# Patient Record
Sex: FEMALE | Race: WHITE | Employment: UNEMPLOYED | URBAN - METROPOLITAN AREA
[De-identification: names, ages, dates, MRNs, and addresses within clinical notes are randomized per-mention and may not be internally consistent; named-entity substitution may affect disease eponyms.]

---

## 2017-07-12 ENCOUNTER — HOSPITAL ENCOUNTER (EMERGENCY)
Age: 42
Discharge: HOME | End: 2017-07-13
Attending: EMERGENCY MEDICINE
Payer: SELF-PAY

## 2017-07-12 DIAGNOSIS — L03.119 CELLULITIS AND ABSCESS OF LEG, EXCEPT FOOT: Primary | ICD-10-CM

## 2017-07-12 DIAGNOSIS — L02.419 CELLULITIS AND ABSCESS OF LEG, EXCEPT FOOT: Primary | ICD-10-CM

## 2017-07-12 LAB
ALBUMIN SERPL BCP-MCNC: 3.6 G/DL (ref 3.5–5)
ALBUMIN/GLOB SERPL: 0.7 {RATIO} (ref 1.2–3.5)
ALP SERPL-CCNC: 108 U/L (ref 50–136)
ALT SERPL-CCNC: 18 U/L (ref 12–65)
ANION GAP BLD CALC-SCNC: 13 MMOL/L (ref 7–16)
AST SERPL W P-5'-P-CCNC: 30 U/L (ref 15–37)
BASOPHILS # BLD AUTO: 0 K/UL (ref 0–0.2)
BASOPHILS # BLD: 0 % (ref 0–2)
BILIRUB SERPL-MCNC: 0.3 MG/DL (ref 0.2–1.1)
BUN SERPL-MCNC: 8 MG/DL (ref 6–23)
CALCIUM SERPL-MCNC: 8.9 MG/DL (ref 8.3–10.4)
CHLORIDE SERPL-SCNC: 98 MMOL/L (ref 98–107)
CO2 SERPL-SCNC: 25 MMOL/L (ref 21–32)
CREAT SERPL-MCNC: 0.68 MG/DL (ref 0.6–1)
DIFFERENTIAL METHOD BLD: ABNORMAL
EOSINOPHIL # BLD: 0 K/UL (ref 0–0.8)
EOSINOPHIL NFR BLD: 0 % (ref 0.5–7.8)
ERYTHROCYTE [DISTWIDTH] IN BLOOD BY AUTOMATED COUNT: 16.3 % (ref 11.9–14.6)
GLOBULIN SER CALC-MCNC: 4.9 G/DL (ref 2.3–3.5)
GLUCOSE SERPL-MCNC: 269 MG/DL (ref 65–100)
HCT VFR BLD AUTO: 34.8 % (ref 35.8–46.3)
HGB BLD-MCNC: 11 G/DL (ref 11.7–15.4)
IMM GRANULOCYTES # BLD: 0.1 K/UL (ref 0–0.5)
IMM GRANULOCYTES NFR BLD AUTO: 0.4 % (ref 0–5)
LACTATE BLD-SCNC: 1.1 MMOL/L (ref 0.5–1.9)
LYMPHOCYTES # BLD AUTO: 16 % (ref 13–44)
LYMPHOCYTES # BLD: 2 K/UL (ref 0.5–4.6)
MCH RBC QN AUTO: 26.2 PG (ref 26.1–32.9)
MCHC RBC AUTO-ENTMCNC: 31.6 G/DL (ref 31.4–35)
MCV RBC AUTO: 82.9 FL (ref 79.6–97.8)
MONOCYTES # BLD: 0.9 K/UL (ref 0.1–1.3)
MONOCYTES NFR BLD AUTO: 7 % (ref 4–12)
NEUTS SEG # BLD: 9.7 K/UL (ref 1.7–8.2)
NEUTS SEG NFR BLD AUTO: 77 % (ref 43–78)
PLATELET # BLD AUTO: 561 K/UL (ref 150–450)
PMV BLD AUTO: 10.2 FL (ref 10.8–14.1)
POTASSIUM SERPL-SCNC: 4.8 MMOL/L (ref 3.5–5.1)
PROT SERPL-MCNC: 8.5 G/DL (ref 6.3–8.2)
RBC # BLD AUTO: 4.2 M/UL (ref 4.05–5.25)
SODIUM SERPL-SCNC: 136 MMOL/L (ref 136–145)
WBC # BLD AUTO: 12.7 K/UL (ref 4.3–11.1)

## 2017-07-12 PROCEDURE — 99284 EMERGENCY DEPT VISIT MOD MDM: CPT | Performed by: EMERGENCY MEDICINE

## 2017-07-12 PROCEDURE — 80053 COMPREHEN METABOLIC PANEL: CPT | Performed by: STUDENT IN AN ORGANIZED HEALTH CARE EDUCATION/TRAINING PROGRAM

## 2017-07-12 PROCEDURE — 87040 BLOOD CULTURE FOR BACTERIA: CPT | Performed by: STUDENT IN AN ORGANIZED HEALTH CARE EDUCATION/TRAINING PROGRAM

## 2017-07-12 PROCEDURE — 87040 BLOOD CULTURE FOR BACTERIA: CPT | Performed by: EMERGENCY MEDICINE

## 2017-07-12 PROCEDURE — 83605 ASSAY OF LACTIC ACID: CPT

## 2017-07-12 PROCEDURE — 85025 COMPLETE CBC W/AUTO DIFF WBC: CPT | Performed by: STUDENT IN AN ORGANIZED HEALTH CARE EDUCATION/TRAINING PROGRAM

## 2017-07-12 PROCEDURE — 96375 TX/PRO/DX INJ NEW DRUG ADDON: CPT | Performed by: EMERGENCY MEDICINE

## 2017-07-12 PROCEDURE — 96365 THER/PROPH/DIAG IV INF INIT: CPT | Performed by: EMERGENCY MEDICINE

## 2017-07-12 PROCEDURE — 75810000289 HC I&D ABSCESS SIMP/COMP/MULT: Performed by: EMERGENCY MEDICINE

## 2017-07-12 RX ORDER — BUPROPION HYDROCHLORIDE 300 MG/1
300 TABLET ORAL
COMMUNITY

## 2017-07-12 RX ORDER — MECLIZINE HYDROCHLORIDE 25 MG/1
TABLET ORAL
COMMUNITY

## 2017-07-12 RX ORDER — ONDANSETRON 2 MG/ML
4 INJECTION INTRAMUSCULAR; INTRAVENOUS
Status: COMPLETED | OUTPATIENT
Start: 2017-07-12 | End: 2017-07-13

## 2017-07-12 RX ORDER — BACLOFEN 10 MG/1
TABLET ORAL 3 TIMES DAILY
COMMUNITY

## 2017-07-12 RX ORDER — GABAPENTIN 600 MG/1
600 TABLET ORAL 3 TIMES DAILY
COMMUNITY
End: 2019-08-22

## 2017-07-12 RX ORDER — QUETIAPINE FUMARATE 200 MG/1
200 TABLET, FILM COATED ORAL 2 TIMES DAILY
COMMUNITY
End: 2019-08-22

## 2017-07-12 RX ORDER — DIAZEPAM 2 MG/1
TABLET ORAL
COMMUNITY
End: 2019-08-22

## 2017-07-12 RX ORDER — OXYBUTYNIN CHLORIDE 5 MG/1
10 TABLET ORAL
COMMUNITY

## 2017-07-12 RX ORDER — SERTRALINE HYDROCHLORIDE 100 MG/1
100 TABLET, FILM COATED ORAL
COMMUNITY

## 2017-07-12 RX ORDER — ACETAMINOPHEN AND CODEINE PHOSPHATE 300; 30 MG/1; MG/1
1 TABLET ORAL
COMMUNITY
End: 2019-08-22

## 2017-07-12 RX ORDER — ATORVASTATIN CALCIUM 20 MG/1
TABLET, FILM COATED ORAL DAILY
COMMUNITY

## 2017-07-12 RX ORDER — CARIPRAZINE 3 MG/1
3 CAPSULE, GELATIN COATED ORAL DAILY
COMMUNITY
End: 2019-08-22

## 2017-07-12 RX ORDER — MORPHINE SULFATE 4 MG/ML
4 INJECTION, SOLUTION INTRAMUSCULAR; INTRAVENOUS
Status: COMPLETED | OUTPATIENT
Start: 2017-07-12 | End: 2017-07-13

## 2017-07-12 RX ORDER — LORAZEPAM 2 MG/ML
1 INJECTION INTRAMUSCULAR
Status: COMPLETED | OUTPATIENT
Start: 2017-07-12 | End: 2017-07-13

## 2017-07-12 NOTE — ED TRIAGE NOTES
Pt thinks she got bit by something on her upper thigh of left leg. Pt states she went to [facility] on Monday and they told her she is fine. Pt has large area with ring around it. It is not draining, it does have several blister areas.

## 2017-07-13 VITALS
TEMPERATURE: 100 F | WEIGHT: 211 LBS | SYSTOLIC BLOOD PRESSURE: 118 MMHG | DIASTOLIC BLOOD PRESSURE: 68 MMHG | HEIGHT: 62 IN | RESPIRATION RATE: 18 BRPM | HEART RATE: 100 BPM | OXYGEN SATURATION: 94 % | BODY MASS INDEX: 38.83 KG/M2

## 2017-07-13 PROCEDURE — 75810000289 HC I&D ABSCESS SIMP/COMP/MULT: Performed by: EMERGENCY MEDICINE

## 2017-07-13 PROCEDURE — 74011000258 HC RX REV CODE- 258: Performed by: EMERGENCY MEDICINE

## 2017-07-13 PROCEDURE — 74011000250 HC RX REV CODE- 250: Performed by: EMERGENCY MEDICINE

## 2017-07-13 PROCEDURE — 74011250636 HC RX REV CODE- 250/636: Performed by: EMERGENCY MEDICINE

## 2017-07-13 RX ORDER — CLINDAMYCIN HYDROCHLORIDE 150 MG/1
450 CAPSULE ORAL 3 TIMES DAILY
Qty: 63 CAP | Refills: 0 | Status: SHIPPED | OUTPATIENT
Start: 2017-07-13 | End: 2017-07-20

## 2017-07-13 RX ORDER — HYDROCODONE BITARTRATE AND ACETAMINOPHEN 5; 325 MG/1; MG/1
1 TABLET ORAL
Qty: 20 TAB | Refills: 0 | Status: SHIPPED | OUTPATIENT
Start: 2017-07-13 | End: 2019-08-22

## 2017-07-13 RX ADMIN — MORPHINE SULFATE 4 MG: 4 INJECTION, SOLUTION INTRAMUSCULAR; INTRAVENOUS at 00:38

## 2017-07-13 RX ADMIN — LORAZEPAM 1 MG: 2 INJECTION INTRAMUSCULAR; INTRAVENOUS at 00:36

## 2017-07-13 RX ADMIN — SODIUM CHLORIDE 600 MG: 900 INJECTION, SOLUTION INTRAVENOUS at 00:41

## 2017-07-13 RX ADMIN — SODIUM CHLORIDE 1000 ML: 900 INJECTION, SOLUTION INTRAVENOUS at 00:41

## 2017-07-13 RX ADMIN — ONDANSETRON 4 MG: 2 INJECTION INTRAMUSCULAR; INTRAVENOUS at 00:38

## 2017-07-13 NOTE — ED PROVIDER NOTES
HPI Comments: 68-year-old diabetic female noticed an area of swelling and redness to the left upper thigh about 4 days ago. She went to her primary care doctor 2 days ago and was told that she just needed to soak it and take Benadryl. She is not sure if she got bitten by something, but did not see an insect. She denies any shortness of breath or chest pain. She has had subjective fevers and vomited once after taking several doses of Norco.  Redness and pain significantly worsened today so she came to the emergency department. No drainage.  has history of MRSA abscess. Patient is a 38 y.o. female presenting with skin infection. The history is provided by the patient. Skin Infection   Pertinent negatives include no chest pain, no abdominal pain, no headaches and no shortness of breath. Past Medical History:   Diagnosis Date    Cancer [facility])     Endocrine disease     thyroid, IDDM       Past Surgical History:   Procedure Laterality Date    HX CHOLECYSTECTOMY      HX GYN      tubes tied, d&c    HX ORTHOPAEDIC      right hand    HX OTHER SURGICAL      rhabdoid muscle to remove cancer         History reviewed. No pertinent family history. Social History     Social History    Marital status: SINGLE     Spouse name: N/A    Number of children: N/A    Years of education: N/A     Occupational History    Not on file. Social History Main Topics    Smoking status: Never Smoker    Smokeless tobacco: Never Used    Alcohol use Yes      Comment: rarely    Drug use: No    Sexual activity: Yes     Other Topics Concern    Not on file     Social History Narrative    No narrative on file         ALLERGIES: Sulfa (sulfonamide antibiotics)    Review of Systems   Constitutional: Positive for chills, diaphoresis, fatigue and fever. HENT: Negative for congestion. Eyes: Negative for visual disturbance. Respiratory: Negative for cough and shortness of breath.     Cardiovascular: Negative for chest pain. Gastrointestinal: Positive for nausea and vomiting. Negative for abdominal pain. Genitourinary: Negative for difficulty urinating. Musculoskeletal: Negative for back pain. Skin: Positive for color change and wound. Neurological: Negative for headaches. Psychiatric/Behavioral: Negative for confusion. The patient is nervous/anxious. Vitals:    07/12/17 1920   BP: 129/87   Pulse: (!) 124   Resp: 16   Temp: 100 °F (37.8 °C)   SpO2: 94%   Weight: 95.7 kg (211 lb)   Height: 5' 2\" (1.575 m)            Physical Exam   Constitutional: She appears well-developed and well-nourished. She appears distressed. HENT:   Head: Normocephalic and atraumatic. Eyes: Conjunctivae are normal. Pupils are equal, round, and reactive to light. Neck: Neck supple. Cardiovascular: Normal heart sounds. Tachycardia present. Pulmonary/Chest: Effort normal. No respiratory distress. Abdominal: Soft. Musculoskeletal:        Left hip: She exhibits tenderness and swelling. She exhibits normal range of motion and no crepitus. Legs:  Neurological: She is alert. Skin: Skin is dry. There is erythema. Psychiatric: Her mood appears anxious. Nursing note and vitals reviewed. MDM  Number of Diagnoses or Management Options  Diagnosis management comments: Parts of this document were created using dragon voice recognition software. The chart has been reviewed but errors may still be present. Patient has extreme anxiety and is crying during ultrasound examination. We'll premedicate with morphine and Ativan for I&D. Blood sugar elevated. Normal lactic. Patient hydrated and given clindamycin due to allergy to sulfa.        Amount and/or Complexity of Data Reviewed  Clinical lab tests: ordered and reviewed (Results for orders placed or performed during the hospital encounter of 07/12/17  -CBC WITH AUTOMATED DIFF       Result                                            Value Ref Range                       WBC                                               12.7 (H)                      4.3 - 11.1 K/uL                 RBC                                               4.20                          4.05 - 5.25 M/uL                HGB                                               11.0 (L)                      11.7 - 15.4 g/dL                HCT                                               34.8 (L)                      35.8 - 46.3 %                   MCV                                               82.9                          79.6 - 97.8 FL                  MCH                                               26.2                          26.1 - 32.9 PG                  MCHC                                              31.6                          31.4 - 35.0 g/dL                RDW                                               16.3 (H)                      11.9 - 14.6 %                   PLATELET                                          561 (H)                       150 - 450 K/uL                  MPV                                               10.2 (L)                      10.8 - 14.1 FL                  DF                                                AUTOMATED                                                     NEUTROPHILS                                       77                            43 - 78 %                       LYMPHOCYTES                                       16                            13 - 44 %                       MONOCYTES                                         7                             4.0 - 12.0 %                    EOSINOPHILS                                       0 (L)                         0.5 - 7.8 %                     BASOPHILS                                         0                             0.0 - 2.0 %                     IMMATURE GRANULOCYTES                             0.4                           0.0 - 5.0 %                     ABS. NEUTROPHILS                                  9.7 (H)                       1.7 - 8.2 K/UL                  ABS. LYMPHOCYTES                                  2.0                           0.5 - 4.6 K/UL                  ABS. MONOCYTES                                    0.9                           0.1 - 1.3 K/UL                  ABS. EOSINOPHILS                                  0.0                           0.0 - 0.8 K/UL                  ABS. BASOPHILS                                    0.0                           0.0 - 0.2 K/UL                  ABS. IMM.  GRANS.                                  0.1                           0.0 - 0.5 K/UL             -METABOLIC PANEL, COMPREHENSIVE       Result                                            Value                         Ref Range                       Sodium                                            136                           136 - 145 mmol/L                Potassium                                         4.8                           3.5 - 5.1 mmol/L                Chloride                                          98                            98 - 107 mmol/L                 CO2                                               25                            21 - 32 mmol/L                  Anion gap                                         13                            7 - 16 mmol/L                   Glucose                                           269 (H)                       65 - 100 mg/dL                  BUN                                               8                             6 - 23 MG/DL                    Creatinine                                        0.68                          0.6 - 1.0 MG/DL                 GFR est AA                                        >60                           >60 ml/min/1.73m2               GFR est non-AA                                    >60                           >60 ml/min/1.73m2               Calcium 8.9                           8.3 - 10.4 MG/DL                Bilirubin, total                                  0.3                           0.2 - 1.1 MG/DL                 ALT (SGPT)                                        18                            12 - 65 U/L                     AST (SGOT)                                        30                            15 - 37 U/L                     Alk. phosphatase                                  108                           50 - 136 U/L                    Protein, total                                    8.5 (H)                       6.3 - 8.2 g/dL                  Albumin                                           3.6                           3.5 - 5.0 g/dL                  Globulin                                          4.9 (H)                       2.3 - 3.5 g/dL                  A-G Ratio                                         0.7 (L)                       1.2 - 3.5                  -POC LACTIC ACID       Result                                            Value                         Ref Range                       Lactic Acid (POC)                                 1.1                           0.5 - 1.9 mmol/L           )  Tests in the medicine section of CPT®: ordered and reviewed    Risk of Complications, Morbidity, and/or Mortality  General comments: 2:10 AM  Line drawn around the area of erythema. Given strict return precautions. I discussed the results of all labs, procedures, radiographs, and treatments with the patient and available family. Treatment plan is agreed upon and the patient is ready for discharge. Questions about treatment in the ED and differential diagnosis of presenting condition were answered. Patient was given verbal discharge instructions including, but not limited to, importance of returning to the emergency department for any concern of worsening or continued symptoms.   Instructions were given to follow up with a primary care provider or specialist within 1-2 days. Adverse effects of medications, if prescribed, were discussed and patient was advised to refrain from significant physical activity until followed up by primary care physician and to not drive or operate heavy machinery after taking any sedating substances. ED Course       I&D Abcess Simple  Date/Time: 7/13/2017 2:09 AM  Performed by: Garett Caputo  Authorized by: Graett Caputo     Consent:     Consent obtained:  Verbal    Consent given by:  Patient    Risks discussed:  Incomplete drainage and pain    Alternatives discussed:  No treatment  Location:     Type:  Abscess    Size:  2cm    Location:  Lower extremity    Lower extremity location:  Leg    Leg location:  L upper leg  Pre-procedure details:     Skin preparation:  Betadine  Anesthesia (see MAR for exact dosages): Anesthesia method:  Local infiltration    Local anesthetic:  Lidocaine 1% w/o epi  Procedure type:     Complexity:  Simple  Procedure details:     Needle aspiration: no      Incision types:  Stab incision and single straight    Scalpel blade:  11    Wound management:  Probed and deloculated and irrigated with saline    Drainage:  Bloody and purulent    Drainage amount:  Scant    Wound treatment:  Wound left open    Packing materials:  None  Post-procedure details:     Patient tolerance of procedure:   Tolerated with difficulty

## 2017-07-13 NOTE — DISCHARGE INSTRUCTIONS
Cellulitis: Care Instructions  Your Care Instructions    Cellulitis is a skin infection. It often occurs after a break in the skin from a scrape, cut, bite, or puncture, or after a rash. The doctor has checked you carefully, but problems can develop later. If you notice any problems or new symptoms, get medical treatment right away. Follow-up care is a key part of your treatment and safety. Be sure to make and go to all appointments, and call your doctor if you are having problems. It's also a good idea to know your test results and keep a list of the medicines you take. How can you care for yourself at home? · Take your antibiotics as directed. Do not stop taking them just because you feel better. You need to take the full course of antibiotics. · Prop up the infected area on pillows to reduce pain and swelling. Try to keep the area above the level of your heart as often as you can. · If your doctor told you how to care for your wound, follow your doctor's instructions. If you did not get instructions, follow this general advice:  ¨ Wash the wound with clean water 2 times a day. Don't use hydrogen peroxide or alcohol, which can slow healing. ¨ You may cover the wound with a thin layer of petroleum jelly, such as Vaseline, and a nonstick bandage. ¨ Apply more petroleum jelly and replace the bandage as needed. · Be safe with medicines. Take pain medicines exactly as directed. ¨ If the doctor gave you a prescription medicine for pain, take it as prescribed. ¨ If you are not taking a prescription pain medicine, ask your doctor if you can take an over-the-counter medicine. To prevent cellulitis in the future  · Try to prevent cuts, scrapes, or other injuries to your skin. Cellulitis most often occurs where there is a break in the skin. · If you get a scrape, cut, mild burn, or bite, wash the wound with clean water as soon as you can to help avoid infection.  Don't use hydrogen peroxide or alcohol, which can slow healing. · If you have swelling in your legs (edema), support stockings and good skin care may help prevent leg sores and cellulitis. · Take care of your feet, especially if you have diabetes or other conditions that increase the risk of infection. Wear shoes and socks. Do not go barefoot. If you have athlete's foot or other skin problems on your feet, talk to your doctor about how to treat them. When should you call for help? Call your doctor now or seek immediate medical care if:  · You have signs that your infection is getting worse, such as:  ¨ Increased pain, swelling, warmth, or redness. ¨ Red streaks leading from the area. ¨ Pus draining from the area. ¨ A fever. · You get a rash. Watch closely for changes in your health, and be sure to contact your doctor if:  · You are not getting better after 1 day (24 hours). · You do not get better as expected. Where can you learn more? Go to http://gian-lyn.info/. Cielo Mayes in the search box to learn more about \"Cellulitis: Care Instructions. \"  Current as of: October 13, 2016  Content Version: 11.3  © 3806-7529 [employer]. Care instructions adapted under license by [employer] (which disclaims liability or warranty for this information). If you have questions about a medical condition or this instruction, always ask your healthcare professional. [place] [address] any warranty or liability for your use of this information. Skin Abscess: Care Instructions  Your Care Instructions    A skin abscess is a bacterial infection that forms a pocket of pus. A boil is a kind of skin abscess. The doctor may have cut an opening in the abscess so that the pus can drain out. You may have gauze in the cut so that the abscess will stay open and keep draining. You may need antibiotics. You will need to follow up with your doctor to make sure the infection has gone away.   The doctor has checked you carefully, but problems can develop later. If you notice any problems or new symptoms, get medical treatment right away. Follow-up care is a key part of your treatment and safety. Be sure to make and go to all appointments, and call your doctor if you are having problems. It's also a good idea to know your test results and keep a list of the medicines you take. How can you care for yourself at home? · Apply warm and dry compresses, a heating pad set on low, or a hot water bottle 3 or 4 times a day for pain. Keep a cloth between the heat source and your skin. · If your doctor prescribed antibiotics, take them as directed. Do not stop taking them just because you feel better. You need to take the full course of antibiotics. · Take pain medicines exactly as directed. ¨ If the doctor gave you a prescription medicine for pain, take it as prescribed. ¨ If you are not taking a prescription pain medicine, ask your doctor if you can take an over-the-counter medicine. · Keep your bandage clean and dry. Change the bandage whenever it gets wet or dirty, or at least one time a day. · If the abscess was packed with gauze:  ¨ Keep follow-up appointments to have the gauze changed or removed. If the doctor instructed you to remove the gauze, gently pull out all of the gauze when your doctor tells you to. ¨ After the gauze is removed, soak the area in warm water for 15 to 20 minutes 2 times a day, until the wound closes. When should you call for help? Call your doctor now or seek immediate medical care if:  · You have signs of worsening infection, such as:  ¨ Increased pain, swelling, warmth, or redness. ¨ Red streaks leading from the infected skin. ¨ Pus draining from the wound. ¨ A fever. Watch closely for changes in your health, and be sure to contact your doctor if:  · You do not get better as expected. Where can you learn more? Go to http://gian-lyn.info/.   Enter Y703 in the search box to learn more about \"Skin Abscess: Care Instructions. \"  Current as of: October 13, 2016  Content Version: 11.3  © 7943-3856 [employer]. Care instructions adapted under license by [employer] (which disclaims liability or warranty for this information). If you have questions about a medical condition or this instruction, always ask your healthcare professional. [address] any warranty or liability for your use of this information.

## 2017-07-17 LAB
BACTERIA SPEC CULT: NORMAL
BACTERIA SPEC CULT: NORMAL
SERVICE CMNT-IMP: NORMAL
SERVICE CMNT-IMP: NORMAL

## 2019-08-21 ENCOUNTER — HOSPITAL ENCOUNTER (OUTPATIENT)
Dept: MAMMOGRAPHY | Age: 44
Discharge: HOME | End: 2019-08-21
Attending: NURSE PRACTITIONER
Payer: COMMERCIAL

## 2019-08-21 DIAGNOSIS — Z12.31 VISIT FOR SCREENING MAMMOGRAM: ICD-10-CM

## 2019-08-21 PROCEDURE — 77067 SCR MAMMO BI INCL CAD: CPT

## 2019-08-23 ENCOUNTER — HOSPITAL ENCOUNTER (OUTPATIENT)
Dept: LAB | Age: 44
Discharge: HOME | End: 2019-08-23
Payer: COMMERCIAL

## 2019-08-23 LAB
ERYTHROCYTE [DISTWIDTH] IN BLOOD BY AUTOMATED COUNT: 13.1 % (ref 11.9–14.6)
HCT VFR BLD AUTO: 44.6 % (ref 35.8–46.3)
HGB BLD-MCNC: 14.8 G/DL (ref 11.7–15.4)
MCH RBC QN AUTO: 31.6 PG (ref 26.1–32.9)
MCHC RBC AUTO-ENTMCNC: 33.2 G/DL (ref 31.4–35)
MCV RBC AUTO: 95.1 FL (ref 79.6–97.8)
NRBC # BLD: 0 K/UL (ref 0–0.2)
PLATELET # BLD AUTO: 450 K/UL (ref 150–450)
PMV BLD AUTO: 9.7 FL (ref 9.4–12.3)
RBC # BLD AUTO: 4.69 M/UL (ref 4.05–5.2)
WBC # BLD AUTO: 9.2 K/UL (ref 4.3–11.1)

## 2019-08-23 PROCEDURE — 36415 COLL VENOUS BLD VENIPUNCTURE: CPT

## 2019-08-23 PROCEDURE — 85027 COMPLETE CBC AUTOMATED: CPT

## 2019-08-23 PROCEDURE — 93005 ELECTROCARDIOGRAM TRACING: CPT | Performed by: ANESTHESIOLOGY

## 2019-08-24 LAB
ATRIAL RATE: 83 BPM
CALCULATED P AXIS, ECG09: 57 DEGREES
CALCULATED R AXIS, ECG10: 53 DEGREES
CALCULATED T AXIS, ECG11: 59 DEGREES
DIAGNOSIS, 93000: NORMAL
P-R INTERVAL, ECG05: 140 MS
Q-T INTERVAL, ECG07: 368 MS
QRS DURATION, ECG06: 80 MS
QTC CALCULATION (BEZET), ECG08: 432 MS
VENTRICULAR RATE, ECG03: 83 BPM

## 2019-08-25 ENCOUNTER — ANESTHESIA EVENT (OUTPATIENT)
Dept: SURGERY | Age: 44
End: 2019-08-25
Payer: COMMERCIAL

## 2019-08-25 VITALS — HEIGHT: 66 IN | BODY MASS INDEX: 28.93 KG/M2 | WEIGHT: 180 LBS

## 2019-08-25 NOTE — H&P
History and Physical    Patient: Ghislaine Leone MRN: [id number]  SSN: [id number]    YOB: 1975  Age: 53 y.o. Sex: female      Vital Signs: /62 P88 R18 SPO2 98%  Visit Vitals  Ht 5' 5.5\" (1.664 m)   Wt 81.6 kg (180 lb)   BMI 29.50 kg/m²        Allergies: Allergies   Allergen Reactions    Latex Hives    Sulfa (Sulfonamide Antibiotics) Nausea and Vomiting       Chief Complaint: back, neck, and shoulder pain     History of Present Illness: back, neck, and shoulder pain     Justification for Procedure: back, neck, and shoulder pain     History:  Past Medical History:   Diagnosis Date    Anemia     Asthma     no inhalers    Bipolar 1 disorder ([facility] [address]     Cancer (HCC)     Diabetes ([facility] [address]     checks sugar-2-4x a week, hypo--70s- [id number]    Endocrine disease     thyroid, IDDM    Gastroparesis     Hypercholesteremia     Hypertension     no meds at this time    Insomnia     Overactive bladder     Stroke ([facility] [address]     tia- no reidual      Past Surgical History:   Procedure Laterality Date    HX CHOLECYSTECTOMY      HX GYN      tubes tied, d&c    HX HEENT      dental    HX ORTHOPAEDIC      right hand    HX OTHER SURGICAL      rhabdoid muscle to remove cancer-1973      Social History     Socioeconomic History    Marital status:      Spouse name: Not on file    Number of children: Not on file    Years of education: Not on file    Highest education level: Not on file   Tobacco Use    Smoking status: Former Smoker    Smokeless tobacco: Never Used   Substance and Sexual Activity    Alcohol use: Yes     Comment: rarely    Drug use: Not Currently    Sexual activity: Yes      Family History   Problem Relation Age of Onset    Lung Disease Mother        Medications:   Prior to Admission medications    Medication Sig Start Date End Date Taking? Authorizing Provider   ARIPiprazole (ABILIFY) 10 mg tablet Take 10 mg by mouth nightly.     Provider, Historical   ALPRAZolam Amara Toussaint 1 mg tablet Take  by mouth three (3) times daily as needed for Anxiety. Provider, Historical   HYDROcodone-acetaminophen (NORCO) 7.5-325 mg per tablet Take 1 Tab by mouth every six (6) hours as needed for Pain. Provider, Historical   insulin degludec (TRESIBA FLEXTOUCH U-100 SC) 20 Units by SubCUTAneous route. 20 units night- 15 units-am    Provider, Historical   metFORMIN (GLUCOPHAGE) 500 mg tablet Take  by mouth two (2) times daily (with meals). Provider, Historical   aspirin 81 mg chewable tablet Take 81 mg by mouth daily. Provider, Historical   naproxen (NAPROSYN PO) Take  by mouth as needed. Provider, Historical   pregabalin (LYRICA) 100 mg capsule Take  by mouth two (2) times a day. Provider, Historical   topiramate (TOPAMAX) 100 mg tablet Take  by mouth two (2) times a day. Provider, Historical   zolpidem (AMBIEN) 5 mg tablet Take  by mouth nightly as needed for Sleep. Provider, Historical   multivitamin (ONE A DAY) tablet Take 1 Tab by mouth daily. Provider, Historical   insulin aspart U-100 (NOVOLOG) 100 unit/mL injection by SubCUTAneous route as needed. Provider, Historical   phentermine (ADIPEX-P) 37.5 mg tablet Take 37.5 mg by mouth every morning. Provider, Historical   ferrous sulfate (IRON) 325 mg (65 mg iron) tablet Take  by mouth Daily (before breakfast). Provider, Historical   sertraline (ZOLOFT) 100 mg tablet Take 100 mg by mouth nightly. Shaun Jean MD   oxybutynin (DITROPAN) 5 mg tablet Take 10 mg by mouth nightly. Shaun Jean MD   buPROPion XL (WELLBUTRIN XL) 300 mg XL tablet Take 300 mg by mouth every morning. Shaun Jean MD   baclofen (LIORESAL) 10 mg tablet Take  by mouth three (3) times daily. Shaun Jean MD   atorvastatin (LIPITOR) 20 mg tablet Take  by mouth daily. Shaun Jean MD   meclizine (ANTIVERT) 25 mg tablet Take  by mouth three (3) times daily as needed.     Shaun Jean MD       Physical Exam:   Heart: regular rate and rhythm, S1, S2 normal, no murmur, click, rub or gallop   Lungs: clear to auscultation bilaterally   Surgical Site: bilateral breasts     Findings/Diagnosis: macromastia    Plan of Care/Planned Procedure: bilateral breast reduction    Signed By: Caroline Romeo MD    August 25, 2019

## 2019-08-25 NOTE — H&P (VIEW-ONLY)
History and Physical    Patient: Esther Wolf MRN: [id number]  SSN: [id number]    YOB: 1975  Age: 53 y.o. Sex: female      Vital Signs: /62 P88 R18 SPO2 98%  Visit Vitals  Ht 5' 5.5\" (1.664 m)   Wt 81.6 kg (180 lb)   BMI 29.50 kg/m²        Allergies: Allergies   Allergen Reactions    Latex Hives    Sulfa (Sulfonamide Antibiotics) Nausea and Vomiting       Chief Complaint: back, neck, and shoulder pain     History of Present Illness: back, neck, and shoulder pain     Justification for Procedure: back, neck, and shoulder pain     History:  Past Medical History:   Diagnosis Date    Anemia     Asthma     no inhalers    Bipolar 1 disorder ([facility] [address]     Cancer (HCC)     Diabetes ([facility] [address]     checks sugar-2-4x a week, hypo--70s- [id number]=2.0    Endocrine disease     thyroid, IDDM    Gastroparesis     Hypercholesteremia     Hypertension     no meds at this time    Insomnia     Overactive bladder     Stroke ([facility] [address]     tia- no reidual      Past Surgical History:   Procedure Laterality Date    HX CHOLECYSTECTOMY      HX GYN      tubes tied, d&c    HX HEENT      dental    HX ORTHOPAEDIC      right hand    HX OTHER SURGICAL      rhabdoid muscle to remove cancer-1973      Social History     Socioeconomic History    Marital status:      Spouse name: Not on file    Number of children: Not on file    Years of education: Not on file    Highest education level: Not on file   Tobacco Use    Smoking status: Former Smoker    Smokeless tobacco: Never Used   Substance and Sexual Activity    Alcohol use: Yes     Comment: rarely    Drug use: Not Currently    Sexual activity: Yes      Family History   Problem Relation Age of Onset    Lung Disease Mother        Medications:   Prior to Admission medications    Medication Sig Start Date End Date Taking? Authorizing Provider   ARIPiprazole (ABILIFY) 10 mg tablet Take 10 mg by mouth nightly.     Provider, Historical   ALPRAZolam Sharonda Mercedes 1 mg tablet Take  by mouth three (3) times daily as needed for Anxiety. Provider, Historical   HYDROcodone-acetaminophen (NORCO) 7.5-325 mg per tablet Take 1 Tab by mouth every six (6) hours as needed for Pain. Provider, Historical   insulin degludec (TRESIBA FLEXTOUCH U-100 SC) 20 Units by SubCUTAneous route. 20 units night- 15 units-am    Provider, Historical   metFORMIN (GLUCOPHAGE) 500 mg tablet Take  by mouth two (2) times daily (with meals). Provider, Historical   aspirin 81 mg chewable tablet Take 81 mg by mouth daily. Provider, Historical   naproxen (NAPROSYN PO) Take  by mouth as needed. Provider, Historical   pregabalin (LYRICA) 100 mg capsule Take  by mouth two (2) times a day. Provider, Historical   topiramate (TOPAMAX) 100 mg tablet Take  by mouth two (2) times a day. Provider, Historical   zolpidem (AMBIEN) 5 mg tablet Take  by mouth nightly as needed for Sleep. Provider, Historical   multivitamin (ONE A DAY) tablet Take 1 Tab by mouth daily. Provider, Historical   insulin aspart U-100 (NOVOLOG) 100 unit/mL injection by SubCUTAneous route as needed. Provider, Historical   phentermine (ADIPEX-P) 37.5 mg tablet Take 37.5 mg by mouth every morning. Provider, Historical   ferrous sulfate (IRON) 325 mg (65 mg iron) tablet Take  by mouth Daily (before breakfast). Provider, Historical   sertraline (ZOLOFT) 100 mg tablet Take 100 mg by mouth nightly. Shaun Jean MD   oxybutynin (DITROPAN) 5 mg tablet Take 10 mg by mouth nightly. Shaun Jean MD   buPROPion XL (WELLBUTRIN XL) 300 mg XL tablet Take 300 mg by mouth every morning. Shaun Jean MD   baclofen (LIORESAL) 10 mg tablet Take  by mouth three (3) times daily. Shaun Jean MD   atorvastatin (LIPITOR) 20 mg tablet Take  by mouth daily. Shaun Jean MD   meclizine (ANTIVERT) 25 mg tablet Take  by mouth three (3) times daily as needed.     Shaun Jean MD       Physical Exam:   Heart: regular rate and rhythm, S1, S2 normal, no murmur, click, rub or gallop   Lungs: clear to auscultation bilaterally   Surgical Site: bilateral breasts     Findings/Diagnosis: macromastia    Plan of Care/Planned Procedure: bilateral breast reduction    Signed By: Diana Dawkins MD    August 25, 2019

## 2019-08-26 ENCOUNTER — ANESTHESIA (OUTPATIENT)
Dept: SURGERY | Age: 44
End: 2019-08-26
Payer: COMMERCIAL

## 2019-08-26 ENCOUNTER — HOSPITAL ENCOUNTER (OUTPATIENT)
Age: 44
Setting detail: OUTPATIENT SURGERY
Discharge: HOME | End: 2019-08-26
Attending: SURGERY | Admitting: SURGERY
Payer: COMMERCIAL

## 2019-08-26 VITALS
TEMPERATURE: 98.5 F | OXYGEN SATURATION: 95 % | WEIGHT: 180 LBS | BODY MASS INDEX: 29.5 KG/M2 | HEART RATE: 87 BPM | SYSTOLIC BLOOD PRESSURE: 128 MMHG | DIASTOLIC BLOOD PRESSURE: 68 MMHG | RESPIRATION RATE: 16 BRPM

## 2019-08-26 DIAGNOSIS — G89.18 POST-OP PAIN: Primary | ICD-10-CM

## 2019-08-26 LAB
GLUCOSE BLD STRIP.AUTO-MCNC: 110 MG/DL (ref 65–100)
GLUCOSE BLD STRIP.AUTO-MCNC: 165 MG/DL (ref 65–100)

## 2019-08-26 PROCEDURE — 74011250636 HC RX REV CODE- 250/636: Performed by: SURGERY

## 2019-08-26 PROCEDURE — 77030002986 HC SUT PROL J&J -A: Performed by: SURGERY

## 2019-08-26 PROCEDURE — 77030037088 HC TUBE ENDOTRACH ORAL NSL COVD-A: Performed by: ANESTHESIOLOGY

## 2019-08-26 PROCEDURE — 74011250636 HC RX REV CODE- 250/636: Performed by: ANESTHESIOLOGY

## 2019-08-26 PROCEDURE — 77030028700 HC BLD TISS PLSM MEDT -E: Performed by: SURGERY

## 2019-08-26 PROCEDURE — 74011000250 HC RX REV CODE- 250: Performed by: SURGERY

## 2019-08-26 PROCEDURE — 77030033067 HC SUT PDO STRATFX SPIR J&J -B: Performed by: SURGERY

## 2019-08-26 PROCEDURE — 77030016570 HC BLNKT BAIR HGGR 3M -B: Performed by: ANESTHESIOLOGY

## 2019-08-26 PROCEDURE — 82962 GLUCOSE BLOOD TEST: CPT

## 2019-08-26 PROCEDURE — 77030002916 HC SUT ETHLN J&J -A: Performed by: SURGERY

## 2019-08-26 PROCEDURE — 74011000250 HC RX REV CODE- 250

## 2019-08-26 PROCEDURE — 76060000036 HC ANESTHESIA 2.5 TO 3 HR: Performed by: SURGERY

## 2019-08-26 PROCEDURE — 77030008462 HC STPLR SKN PROX J&J -A: Performed by: SURGERY

## 2019-08-26 PROCEDURE — 77030002933 HC SUT MCRYL J&J -A: Performed by: SURGERY

## 2019-08-26 PROCEDURE — 77030033138 HC SUT PGA STRATFX J&J -B: Performed by: SURGERY

## 2019-08-26 PROCEDURE — 88307 TISSUE EXAM BY PATHOLOGIST: CPT

## 2019-08-26 PROCEDURE — 76210000020 HC REC RM PH II FIRST 0.5 HR: Performed by: SURGERY

## 2019-08-26 PROCEDURE — 77030040361 HC SLV COMPR DVT MDII -B: Performed by: SURGERY

## 2019-08-26 PROCEDURE — 77030039425 HC BLD LARYNG TRULITE DISP TELE -A: Performed by: ANESTHESIOLOGY

## 2019-08-26 PROCEDURE — 74011250636 HC RX REV CODE- 250/636

## 2019-08-26 PROCEDURE — 77030006625 HC BLD GRFT DRMTO INLC -C: Performed by: SURGERY

## 2019-08-26 PROCEDURE — 77030018836 HC SOL IRR NACL ICUM -A: Performed by: SURGERY

## 2019-08-26 PROCEDURE — 76010000172 HC OR TIME 2.5 TO 3 HR INTENSV-TIER 1: Performed by: SURGERY

## 2019-08-26 PROCEDURE — 76210000063 HC OR PH I REC FIRST 0.5 HR: Performed by: SURGERY

## 2019-08-26 RX ORDER — PROPOFOL 10 MG/ML
INJECTION, EMULSION INTRAVENOUS AS NEEDED
Status: DISCONTINUED | OUTPATIENT
Start: 2019-08-26 | End: 2019-08-26 | Stop reason: HOSPADM

## 2019-08-26 RX ORDER — LIDOCAINE HYDROCHLORIDE 20 MG/ML
INJECTION, SOLUTION EPIDURAL; INFILTRATION; INTRACAUDAL; PERINEURAL AS NEEDED
Status: DISCONTINUED | OUTPATIENT
Start: 2019-08-26 | End: 2019-08-26 | Stop reason: HOSPADM

## 2019-08-26 RX ORDER — SODIUM CHLORIDE 0.9 % (FLUSH) 0.9 %
5-40 SYRINGE (ML) INJECTION EVERY 8 HOURS
Status: DISCONTINUED | OUTPATIENT
Start: 2019-08-26 | End: 2019-08-26 | Stop reason: HOSPADM

## 2019-08-26 RX ORDER — ROCURONIUM BROMIDE 10 MG/ML
INJECTION, SOLUTION INTRAVENOUS AS NEEDED
Status: DISCONTINUED | OUTPATIENT
Start: 2019-08-26 | End: 2019-08-26 | Stop reason: HOSPADM

## 2019-08-26 RX ORDER — CEFAZOLIN SODIUM/WATER 2 G/20 ML
2 SYRINGE (ML) INTRAVENOUS
Status: COMPLETED | OUTPATIENT
Start: 2019-08-26 | End: 2019-08-26

## 2019-08-26 RX ORDER — DEXAMETHASONE SODIUM PHOSPHATE 4 MG/ML
INJECTION, SOLUTION INTRA-ARTICULAR; INTRALESIONAL; INTRAMUSCULAR; INTRAVENOUS; SOFT TISSUE AS NEEDED
Status: DISCONTINUED | OUTPATIENT
Start: 2019-08-26 | End: 2019-08-26 | Stop reason: HOSPADM

## 2019-08-26 RX ORDER — HYDROMORPHONE HYDROCHLORIDE 2 MG/ML
0.5 INJECTION, SOLUTION INTRAMUSCULAR; INTRAVENOUS; SUBCUTANEOUS
Status: DISCONTINUED | OUTPATIENT
Start: 2019-08-26 | End: 2019-08-26 | Stop reason: HOSPADM

## 2019-08-26 RX ORDER — FENTANYL CITRATE 50 UG/ML
INJECTION, SOLUTION INTRAMUSCULAR; INTRAVENOUS AS NEEDED
Status: DISCONTINUED | OUTPATIENT
Start: 2019-08-26 | End: 2019-08-26 | Stop reason: HOSPADM

## 2019-08-26 RX ORDER — BUPIVACAINE HYDROCHLORIDE AND EPINEPHRINE 5; 5 MG/ML; UG/ML
INJECTION, SOLUTION EPIDURAL; INTRACAUDAL; PERINEURAL AS NEEDED
Status: DISCONTINUED | OUTPATIENT
Start: 2019-08-26 | End: 2019-08-26 | Stop reason: HOSPADM

## 2019-08-26 RX ORDER — SODIUM CHLORIDE, SODIUM LACTATE, POTASSIUM CHLORIDE, CALCIUM CHLORIDE 600; 310; 30; 20 MG/100ML; MG/100ML; MG/100ML; MG/100ML
75 INJECTION, SOLUTION INTRAVENOUS CONTINUOUS
Status: DISCONTINUED | OUTPATIENT
Start: 2019-08-26 | End: 2019-08-26 | Stop reason: HOSPADM

## 2019-08-26 RX ORDER — OXYCODONE AND ACETAMINOPHEN 10; 325 MG/1; MG/1
1 TABLET ORAL AS NEEDED
Status: DISCONTINUED | OUTPATIENT
Start: 2019-08-26 | End: 2019-08-26 | Stop reason: HOSPADM

## 2019-08-26 RX ORDER — ONDANSETRON 2 MG/ML
INJECTION INTRAMUSCULAR; INTRAVENOUS AS NEEDED
Status: DISCONTINUED | OUTPATIENT
Start: 2019-08-26 | End: 2019-08-26 | Stop reason: HOSPADM

## 2019-08-26 RX ORDER — MIDAZOLAM HYDROCHLORIDE 1 MG/ML
2 INJECTION, SOLUTION INTRAMUSCULAR; INTRAVENOUS
Status: DISCONTINUED | OUTPATIENT
Start: 2019-08-26 | End: 2019-08-26 | Stop reason: HOSPADM

## 2019-08-26 RX ORDER — SODIUM CHLORIDE 0.9 % (FLUSH) 0.9 %
5-40 SYRINGE (ML) INJECTION AS NEEDED
Status: DISCONTINUED | OUTPATIENT
Start: 2019-08-26 | End: 2019-08-26 | Stop reason: HOSPADM

## 2019-08-26 RX ORDER — OXYCODONE HYDROCHLORIDE 5 MG/1
5 TABLET ORAL
Status: DISCONTINUED | OUTPATIENT
Start: 2019-08-26 | End: 2019-08-26 | Stop reason: HOSPADM

## 2019-08-26 RX ORDER — SUCCINYLCHOLINE CHLORIDE 20 MG/ML
INJECTION INTRAMUSCULAR; INTRAVENOUS AS NEEDED
Status: DISCONTINUED | OUTPATIENT
Start: 2019-08-26 | End: 2019-08-26 | Stop reason: HOSPADM

## 2019-08-26 RX ORDER — HYDROCODONE BITARTRATE AND ACETAMINOPHEN 10; 325 MG/1; MG/1
1 TABLET ORAL
Qty: 15 TAB | Refills: 0 | Status: SHIPPED | OUTPATIENT
Start: 2019-08-26 | End: 2019-08-29

## 2019-08-26 RX ORDER — ACETAMINOPHEN 10 MG/ML
INJECTION, SOLUTION INTRAVENOUS AS NEEDED
Status: DISCONTINUED | OUTPATIENT
Start: 2019-08-26 | End: 2019-08-26 | Stop reason: HOSPADM

## 2019-08-26 RX ADMIN — FENTANYL CITRATE 50 MCG: 50 INJECTION, SOLUTION INTRAMUSCULAR; INTRAVENOUS at 09:33

## 2019-08-26 RX ADMIN — FENTANYL CITRATE 25 MCG: 50 INJECTION, SOLUTION INTRAMUSCULAR; INTRAVENOUS at 08:45

## 2019-08-26 RX ADMIN — SODIUM CHLORIDE, SODIUM LACTATE, POTASSIUM CHLORIDE, AND CALCIUM CHLORIDE: 600; 310; 30; 20 INJECTION, SOLUTION INTRAVENOUS at 09:28

## 2019-08-26 RX ADMIN — PROPOFOL 30 MG: 10 INJECTION, EMULSION INTRAVENOUS at 07:53

## 2019-08-26 RX ADMIN — LIDOCAINE HYDROCHLORIDE 100 MG: 20 INJECTION, SOLUTION EPIDURAL; INFILTRATION; INTRACAUDAL; PERINEURAL at 07:38

## 2019-08-26 RX ADMIN — PROPOFOL 40 MG: 10 INJECTION, EMULSION INTRAVENOUS at 09:23

## 2019-08-26 RX ADMIN — ACETAMINOPHEN 1000 MG: 10 INJECTION, SOLUTION INTRAVENOUS at 09:47

## 2019-08-26 RX ADMIN — PROPOFOL 150 MG: 10 INJECTION, EMULSION INTRAVENOUS at 07:38

## 2019-08-26 RX ADMIN — ONDANSETRON 4 MG: 2 INJECTION INTRAMUSCULAR; INTRAVENOUS at 09:46

## 2019-08-26 RX ADMIN — ROCURONIUM BROMIDE 5 MG: 10 INJECTION, SOLUTION INTRAVENOUS at 07:38

## 2019-08-26 RX ADMIN — Medication 2 G: at 07:54

## 2019-08-26 RX ADMIN — DEXAMETHASONE SODIUM PHOSPHATE 4 MG: 4 INJECTION, SOLUTION INTRA-ARTICULAR; INTRALESIONAL; INTRAMUSCULAR; INTRAVENOUS; SOFT TISSUE at 08:34

## 2019-08-26 RX ADMIN — FENTANYL CITRATE 25 MCG: 50 INJECTION, SOLUTION INTRAMUSCULAR; INTRAVENOUS at 08:50

## 2019-08-26 RX ADMIN — FENTANYL CITRATE 50 MCG: 50 INJECTION, SOLUTION INTRAMUSCULAR; INTRAVENOUS at 07:52

## 2019-08-26 RX ADMIN — SODIUM CHLORIDE, SODIUM LACTATE, POTASSIUM CHLORIDE, AND CALCIUM CHLORIDE 75 ML/HR: 600; 310; 30; 20 INJECTION, SOLUTION INTRAVENOUS at 07:28

## 2019-08-26 RX ADMIN — SUCCINYLCHOLINE CHLORIDE 120 MG: 20 INJECTION INTRAMUSCULAR; INTRAVENOUS at 07:39

## 2019-08-26 RX ADMIN — PROPOFOL 20 MG: 10 INJECTION, EMULSION INTRAVENOUS at 08:07

## 2019-08-26 RX ADMIN — HYDROMORPHONE HYDROCHLORIDE 0.5 MG: 2 INJECTION INTRAMUSCULAR; INTRAVENOUS; SUBCUTANEOUS at 10:30

## 2019-08-26 RX ADMIN — FENTANYL CITRATE 50 MCG: 50 INJECTION, SOLUTION INTRAMUSCULAR; INTRAVENOUS at 07:38

## 2019-08-26 NOTE — INTERVAL H&P NOTE
H&P Update:  Kaleb Longoria was seen and examined. History and physical has been reviewed. The patient has been examined. There have been no significant clinical changes since the completion of the originally dated History and Physical.  Patient identified by surgeon; surgical site was confirmed by patient and surgeon.

## 2019-08-26 NOTE — OP NOTES
Operative Report    Patient: Sal Arteaga  MRN: [id number]  SSN: [id number]    YOB: 1975  Age: 53 y.o. Sex: female       Date of Surgery: 8/26/2019     Preoperative Diagnosis:  Hypertrophy of breast [N62]  Chronic pain of both shoulders [M25.511, G89.29, M25.512]  Neck pain [M54.2]  Pain in thoracic spine [M54.6]    Postoperative Diagnosis: Hypertrophy of breast [N62]  Chronic pain of both shoulders [M25.511, G89.29, M25.512]  Neck pain [M54.2]  Pain in thoracic spine [M54.6]    Surgeon(s) and Role:     * Elis Delaney MD - Primary    Anesthesia: General    Procedure: Procedure(s):  BIALTERAL BREAST REDUCTION    Procedure in Detail:   After general anesthesia was obtained, the chest area was prepped and draped under sterile conditions. A marking pen was used to khushbu out inverted T incisions and attention was first directed to the right breast.     A 42 mm disk was placed around the nipple and areola and a marking made here. An incision was made along all the markings and an inferior dermis pedicle was developed up to and around the nipple and areola. A medial and lateral segment of skin and breast tissue was removed and a superior flap of skin and breast tissue elevated. A superior central portion of breast tissue was removed as well. Total amount of tissue removed on the right side was 714 grams. Having removed the breast tissue and obtained hemostasis, closure was accomplished in an inverted T fashion. Monocryl 3-0 was used to close the vertical incision and 3-0 Quill PD suture used to close the transverse incision. At this point, the 42 mm cookie cutter was placed at the appropriate site for the nipple and areolar and this area deepithelialized. The nipple and areola was brought through, leaving it attached to the inferior keel of breast tissue.  Four corner sutures of 5-0 nylon were used to suture the areolar area and then the periareolar closure was accomplished with 3-0 Monoderm Quill suture. Attention was then directed to the left breast where an identical procedure was performed and 659 grams of tissue removed here. Satisfied with closure on both sides, 25 mL of 0.5% Marcaine with epinephrine were injected into each breast and then Xeroform gauze and a bulky dressing applied and the operation terminated. Estimated Blood Loss:  150cc      Implants: * No implants in log *            Specimens:   ID Type Source Tests Collected by Time Destination   1 : Right Breast Tissue Fresh Breast  Elis Delaney MD 8/26/2019 2207 Pathology   2 : Left Breast Tissue Fresh Breast  Elis Delaney MD 8/26/2019 0622 Pathology           Drains: None                Complications: None    Counts: Sponge and needle counts were correct times two. Signed By:  Yissel Vigil MD     August 26, 2019             Operative Report    Patient: Sal Arteaga  MRN: [id number]  SSN: [id number]    YOB: 1975  Age: 53 y.o. Sex: female       Date of Surgery: 8/26/2019     Preoperative Diagnosis:  Hypertrophy of breast [N62]  Chronic pain of both shoulders [M25.511, G89.29, M25.512]  Neck pain [M54.2]  Pain in thoracic spine [M54.6]    Postoperative Diagnosis: Hypertrophy of breast [N62]  Chronic pain of both shoulders [M25.511, G89.29, M25.512]  Neck pain [M54.2]  Pain in thoracic spine [M54.6]    Surgeon(s) and Role:     * Elis Delaney MD - Primary    Anesthesia: General    Procedure: Procedure(s):  BIALTERAL BREAST REDUCTION    Procedure in Detail:   After general anesthesia was obtained, the chest area was prepped and draped under sterile conditions. A marking pen was used to khushbu out inverted T incisions and attention was first directed to the right breast.     A 42 mm disk was placed around the nipple and areola and a marking made here. An incision was made along all the markings and an inferior dermis pedicle was developed up to and around the nipple and areola.  A medial and lateral segment of skin and breast tissue was removed and a superior flap of skin and breast tissue elevated. A superior central portion of breast tissue was removed as well. Total amount of tissue removed on the right side was 714 grams. Having removed the breast tissue and obtained hemostasis, closure was accomplished in an inverted T fashion. Monocryl 3-0 was used to close the vertical incision and 3-0 Quill PD suture used to close the transverse incision. At this point, the 42 mm cookie cutter was placed at the appropriate site for the nipple and areolar and this area deepithelialized. The nipple and areola was brought through, leaving it attached to the inferior keel of breast tissue. Four corner sutures of 5-0 nylon were used to suture the areolar area and then the periareolar closure was accomplished with 3-0 Monoderm Quill suture. Attention was then directed to the left breast where an identical procedure was performed and 659 grams of tissue removed here. Satisfied with closure on both sides, 25 mL of 0.5% Marcaine with epinephrine were injected into each breast and then Xeroform gauze and a bulky dressing applied and the operation terminated. Estimated Blood Loss:  150cc      Implants: * No implants in log *            Specimens:   ID Type Source Tests Collected by Time Destination   1 : Right Breast Tissue Fresh Breast  Tino Liriano MD 8/26/2019 3934 Pathology   2 : Left Breast Tissue Fresh Breast  Tino Liriano MD 8/26/2019 3049 Pathology           Drains: None                Complications: None    Counts: Sponge and needle counts were correct times two. Signed By:  Bela Phillips MD     August 26, 2019             Operative Report    Patient: Dayna Rodgers  MRN: [id number]  SSN: [id number]    YOB: 1975  Age: 53 y.o.   Sex: female       Date of Surgery: 8/26/2019     Preoperative Diagnosis:  Hypertrophy of breast [N62]  Chronic pain of both shoulders [M25.511, G89.29, M25.512]  Neck pain [M54.2]  Pain in thoracic spine [M54.6]    Postoperative Diagnosis: Hypertrophy of breast [N62]  Chronic pain of both shoulders [M25.511, G89.29, M25.512]  Neck pain [M54.2]  Pain in thoracic spine [M54.6]    Surgeon(s) and Role:     * John Newman MD - Primary    Anesthesia: General    Procedure: Procedure(s):  BIALTERAL BREAST REDUCTION    Procedure in Detail:   After general anesthesia was obtained, the chest area was prepped and draped under sterile conditions. A marking pen was used to khushbu out inverted T incisions and attention was first directed to the right breast.     A 42 mm disk was placed around the nipple and areola and a marking made here. An incision was made along all the markings and an inferior dermis pedicle was developed up to and around the nipple and areola. A medial and lateral segment of skin and breast tissue was removed and a superior flap of skin and breast tissue elevated. A superior central portion of breast tissue was removed as well. Total amount of tissue removed on the right side was 714 grams. Having removed the breast tissue and obtained hemostasis, closure was accomplished in an inverted T fashion. Monocryl 3-0 was used to close the vertical incision and 3-0 Quill PD suture used to close the transverse incision. At this point, the 42 mm cookie cutter was placed at the appropriate site for the nipple and areolar and this area deepithelialized. The nipple and areola was brought through, leaving it attached to the inferior keel of breast tissue. Four corner sutures of 5-0 nylon were used to suture the areolar area and then the periareolar closure was accomplished with 3-0 Monoderm Quill suture. Attention was then directed to the left breast where an identical procedure was performed and 659 grams of tissue removed here.  Satisfied with closure on both sides, 25 mL of 0.5% Marcaine with epinephrine were injected into each breast and then Xeroform gauze and a bulky dressing applied and the operation terminated. Estimated Blood Loss:  150cc      Implants: * No implants in log *            Specimens:   ID Type Source Tests Collected by Time Destination   1 : Right Breast Tissue Fresh Breast  Torri Rhodes MD 8/26/2019 1582 Pathology   2 : Left Breast Tissue Fresh Breast  Torri Rhodes MD 8/26/2019 0465 Pathology           Drains: None                Complications: None    Counts: Sponge and needle counts were correct times two.     Signed By:  Sylvester Burns MD     August 26, 2019      ,latha

## 2019-08-26 NOTE — DISCHARGE INSTRUCTIONS
Patient Education        Breast Reduction: What to Expect at Home  Your Recovery  Breast reduction surgery removes some of the breast tissue and skin from the breasts. This reshapes and lifts the breasts and reduces their size. It can also make the dark area around the nipple smaller. After surgery, you will probably feel weak. You may feel sore for 2 to 3 weeks. You also may feel pulling or stretching in your breast area. Although you may need pain medicine for a week or two, you can expect to feel better and stronger each day. For several weeks, you may get tired easily or have less energy than usual. You also may have the feeling that fluid is moving in your breasts. This feeling is normal and will go away over time. Stitches usually are removed in 5 to 10 days. Your new breasts may feel firmer and look rounder. Breast reduction may change the normal feeling in your breast. But in time, some feeling may return. Keep in mind that it may take time to get used to your new breasts. You will have swelling at first, but the breasts will soften and develop better shape over time. This care sheet gives you a general idea about how long it will take for you to recover. But each person recovers at a different pace. Follow the steps below to get better as quickly as possible. How can you care for yourself at home? Activity    · Rest when you feel tired. Getting enough sleep will help you recover.     · For about 2 weeks after surgery, avoid lifting anything that would make you strain. This may include heavy grocery bags and milk containers, a heavy briefcase or backpack, cat litter or dog food bags, a vacuum , or a child. Do not lift anything over your head for 2 to 3 weeks.     · Ask your doctor when you can drive again.     · Ask your doctor when it is okay for you to have sex.     · You can take your first shower the day after your drain or bandage is removed. This is usually within about 1 week.  Sometimes doctors say it is okay to shower the day after surgery. Do not take a bath or soak in a hot tub for about 4 weeks.     · You will probably be able to go back to work or your normal routine in 2 to 3 weeks. This depends on the type of work you do and any further treatment. Diet    · You can eat your normal diet. If your stomach is upset, try bland, low-fat foods like plain rice, broiled chicken, toast, and yogurt.     · Drink plenty of fluids (unless your doctor tells you not to).     · You may notice that your bowel movements are not regular right after your surgery. This is common. Try to avoid constipation and straining with bowel movements. Take a fiber supplement. If you have not had a bowel movement after a couple of days, take a mild laxative. Medicines    · Your doctor will tell you if and when you can restart your medicines. He or she will also give you instructions about taking any new medicines.     · If you take blood thinners, such as warfarin (Coumadin), clopidogrel (Plavix), or aspirin, be sure to talk to your doctor. He or she will tell you if and when to start taking those medicines again. Make sure that you understand exactly what your doctor wants you to do.     · Take pain medicines exactly as directed. ? If the doctor gave you a prescription medicine for pain, take it as prescribed. ? If you are not taking a prescription pain medicine, ask your doctor if you can take an over-the-counter medicine.     · If you think your pain medicine is making you sick to your stomach:  ? Take your medicine after meals (unless your doctor has told you not to). ? Ask your doctor for a different pain medicine.     · If you were given medicine for nausea, take it as directed.     · If your doctor prescribed antibiotics, take them as directed. Do not stop taking them just because you feel better. You need to take the full course of antibiotics.    Incision care    · If your doctor gave you specific instructions on how to care for your incision, follow those instructions.     · You may be wearing a special bra that holds your bandages in place after the surgery. Your doctor will tell you when you can stop wearing the bra. Your doctor may want you to wear the bra at night as well as during the day for several weeks. Do not wear an underwire bra for 1 month.     · If you have strips of tape on your incision, leave the tape on for a week or until it falls off. Or follow your doctor's instructions for removing the tape.     · Wash the area daily with warm, soapy water, and pat it dry. Don't use hydrogen peroxide or alcohol, which can slow healing.     · You may cover the area with a gauze bandage if it weeps or rubs against clothing. Change the bandage every day. Consider having someone help you with this. Exercise    · Try to walk each day. Start by walking a little more than you did the day before. Bit by bit, increase the amount you walk. Walking boosts blood flow and helps prevent pneumonia and constipation.     · Avoid strenuous activities, such as bicycle riding, jogging, weight lifting, or aerobic exercise, until your doctor says it is okay.     · Your doctor will tell you when to begin stretching exercises and normal activities.    Ice    · Put ice or a cold pack over your breast for 10 to 20 minutes at a time. Try to do this every 1 to 2 hours for the next 3 days (when you are awake) or until the swelling goes down. Put a thin cloth between the ice and your skin. Other instructions    · You may have one or more drains near your incisions. Your doctor will tell you how to take care of them. Drains are usually removed in the first week after surgery. Follow-up care is a key part of your treatment and safety. Be sure to make and go to all appointments, and call your doctor if you are having problems. It's also a good idea to know your test results and keep a list of the medicines you take.   When should you call for help?  Call 911 anytime you think you may need emergency care. For example, call if:    · You passed out (lost consciousness).     · You have sudden chest pain and shortness of breath, or you cough up blood.    Call your doctor now or seek immediate medical care if:    · You have pain that does not get better after you take pain medicine.     · You have loose stitches, or your incision comes open.     · You are bleeding from the incision.     · You have signs of infection, such as:  ? Increased pain, swelling, warmth, or redness. ? Red streaks leading from the incision. ? Pus draining from the incision. ? A fever.     · You have signs of a blood clot in your leg, such as:  ? Pain in your calf, back of the knee, thigh, or groin. ? Redness and swelling in your leg or groin.    Watch closely for any changes in your health, and be sure to contact your doctor if:    · You do not get better as expected. Where can you learn more? Go to http://gian-lyn.info/. Enter T113 in the search box to learn more about \"Breast Reduction: What to Expect at Home. \"  Current as of: April 1, 2019  Content Version: 12.1  © 9421-2857 [employer]. Care instructions adapted under license by [employer] (which disclaims liability or warranty for this information). If you have questions about a medical condition or this instruction, always ask your healthcare professional. [place] [address] any warranty or liability for your use of this information. ·     DIET  · Clear liquids until no nausea or vomiting; then light diet for the first day. · Advance to regular diet on second day, unless your doctor orders otherwise. · If nausea and vomiting continues, call your doctor. PAIN  · Take pain medication as directed by your doctor. · Call your doctor if pain is NOT relieved by medication. · DO NOT take aspirin of blood thinners unless directed by your doctor. CALL YOUR DOCTOR IF   · Excessive bleeding that does not stop after holding pressure over the area  · Temperature of 101 degrees F or above  · Excessive redness, swelling or bruising, and/ or green or yellow, smelly discharge from incision    AFTER ANESTHESIA   · For the first 24 hours: DO NOT Drive, Drink alcoholic beverages, or Make important decisions. · Be aware of dizziness following anesthesia and while taking pain medication. After general anesthesia or intravenous sedation, for 24 hours or while taking prescription Narcotics:  · Limit your activities  · A responsible adult needs to be with you for the next 24 hours  · Do not drive and operate hazardous machinery  · Do not make important personal or business decisions  · Do  not drink alcoholic beverages  · If you have not urinated within 8 hours after discharge, please contact your surgeon on call. *  Please give a list of your current medications to your Primary Care Provider. *  Please update this list whenever your medications are discontinued, doses are      changed, or new medications (including over-the-counter products) are added. *  Please carry medication information at all times in case of emergency situations. These are general instructions for a healthy lifestyle:  No smoking/ No tobacco products/ Avoid exposure to second hand smoke  Surgeon General's Warning:  Quitting smoking now greatly reduces serious risk to your health. Obesity, smoking, and sedentary lifestyle greatly increases your risk for illness  A healthy diet, regular physical exercise & weight monitoring are important for maintaining a healthy lifestyle    You may be retaining fluid if you have a history of heart failure or if you experience any of the following symptoms:  Weight gain of 3 pounds or more overnight or 5 pounds in a week, increased swelling in our hands or feet or shortness of breath while lying flat in bed.   Please call your doctor as soon as you notice any of these symptoms; do not wait until your next office visit.

## 2019-08-26 NOTE — ANESTHESIA PREPROCEDURE EVALUATION
Relevant Problems   No relevant active problems       Anesthetic History   No history of anesthetic complications            Review of Systems / Medical History  Patient summary reviewed and pertinent labs reviewed    Pulmonary            Asthma        Neuro/Psych         TIA and psychiatric history     Cardiovascular    Hypertension              Exercise tolerance: >4 METS     GI/Hepatic/Renal  Within defined limits              Endo/Other    Diabetes: type 2    Obesity     Other Findings            Physical Exam    Airway  Mallampati: II  TM Distance: > 6 cm  Neck ROM: normal range of motion        Cardiovascular    Rhythm: regular           Dental         Pulmonary                 Abdominal  GI exam deferred       Other Findings            Anesthetic Plan    ASA: 3  Anesthesia type: general          Induction: Intravenous  Anesthetic plan and risks discussed with: Patient      Ofirmev intraop

## 2019-08-26 NOTE — PERIOP NOTES
11:45 AM    Called Dr. Leslie Tyson to get discharge instructions since he had forgotten to put them in the computer before he had left. He stated that the patient can \"take ACE wrap off tomorrow night and then put bra on along with putting sterile 4x4s in the bra. Also that she can get the incisions wet in three days. \"

## 2019-08-26 NOTE — ANESTHESIA POSTPROCEDURE EVALUATION
Procedure(s):  BIALTERAL BREAST REDUCTION. general    Anesthesia Post Evaluation      Multimodal analgesia: multimodal analgesia used between 6 hours prior to anesthesia start to PACU discharge  Patient location during evaluation: PACU  Patient participation: complete - patient participated  Level of consciousness: awake and alert  Pain management: adequate  Airway patency: patent  Anesthetic complications: no  Cardiovascular status: acceptable  Respiratory status: acceptable  Hydration status: acceptable  Post anesthesia nausea and vomiting:  none      Vitals Value Taken Time   /80 8/26/2019 10:32 AM   Temp 36.7 °C (98 °F) 8/26/2019 10:13 AM   Pulse 100 8/26/2019 10:32 AM   Resp 16 8/26/2019 10:27 AM   SpO2 100 % 8/26/2019 10:32 AM   Vitals shown include unvalidated device data.

## 2019-08-26 NOTE — BRIEF OP NOTE
BRIEF OPERATIVE NOTE    Date of Procedure: 8/26/2019   Preoperative Diagnosis: Hypertrophy of breast [N62]  Chronic pain of both shoulders [M25.511, G89.29, M25.512]  Neck pain [M54.2]  Pain in thoracic spine [M54.6]  Postoperative Diagnosis: Hypertrophy of breast [N62]  Chronic pain of both shoulders [M25.511, G89.29, M25.512]  Neck pain [M54.2]  Pain in thoracic spine [M54.6]    Procedure(s):  BIALTERAL BREAST REDUCTION  Surgeon(s) and Role:     Hill Lovett MD - Primary         Surgical Assistant: 0    Surgical Staff:  Circ-1: Ryan Kwon RN  Circ-2: Maya Zee  Scrub Tech-1: Alfred Portal; Pankaj Kim  Scrub Tech-2: Dmitri Cap  Event Time In Time Out   Incision Start 1660    Incision Close 3431      Anesthesia: General   Estimated Blood Loss: 150cc  Specimens:   ID Type Source Tests Collected by Time Destination   1 : Right Breast Tissue Fresh Breast  Derick Cohen MD 8/26/2019 2643 Pathology   2 : Left Breast Tissue Fresh Breast  Derick Cohen MD 8/26/2019 3410 Pathology      Findings: 0   Complications: 0  Implants: * No implants in log *

## 2020-10-13 ENCOUNTER — TRANSCRIBE ORDER (OUTPATIENT)
Dept: SCHEDULING | Age: 45
End: 2020-10-13

## 2020-10-13 DIAGNOSIS — Z12.31 SCREENING MAMMOGRAM FOR HIGH-RISK PATIENT: Primary | ICD-10-CM

## 2020-12-05 ENCOUNTER — HOSPITAL ENCOUNTER (OUTPATIENT)
Dept: MAMMOGRAPHY | Age: 45
Discharge: HOME | End: 2020-12-05
Attending: NURSE PRACTITIONER
Payer: COMMERCIAL

## 2020-12-05 DIAGNOSIS — Z12.31 SCREENING MAMMOGRAM FOR HIGH-RISK PATIENT: ICD-10-CM

## 2020-12-05 PROCEDURE — 77067 SCR MAMMO BI INCL CAD: CPT

## 2022-07-27 ENCOUNTER — HOSPITAL ENCOUNTER (OUTPATIENT)
Dept: LAB | Age: 47
Discharge: HOME | End: 2022-07-30

## 2022-07-27 PROCEDURE — 88312 SPECIAL STAINS GROUP 1: CPT

## 2022-07-27 PROCEDURE — 88305 TISSUE EXAM BY PATHOLOGIST: CPT

## 2022-08-29 ENCOUNTER — HOSPITAL ENCOUNTER (OUTPATIENT)
Dept: NUCLEAR MEDICINE | Age: 47
Discharge: HOME | End: 2022-09-01
Payer: COMMERCIAL

## 2022-08-29 DIAGNOSIS — R14.0 ABDOMINAL DISTENTION: ICD-10-CM

## 2022-08-29 DIAGNOSIS — R11.14 BILIOUS VOMITING, PRESENCE OF NAUSEA NOT SPECIFIED: ICD-10-CM

## 2022-08-29 DIAGNOSIS — R10.9 ABDOMINAL PAIN, UNSPECIFIED ABDOMINAL LOCATION: ICD-10-CM

## 2022-08-29 PROCEDURE — A9541 TC99M SULFUR COLLOID: HCPCS | Performed by: INTERNAL MEDICINE

## 2022-08-29 PROCEDURE — 78264 GASTRIC EMPTYING IMG STUDY: CPT

## 2022-08-29 PROCEDURE — 3430000000 HC RX DIAGNOSTIC RADIOPHARMACEUTICAL: Performed by: INTERNAL MEDICINE

## 2022-08-29 RX ADMIN — Medication 1 MILLICURIE: at 08:00

## (undated) DEVICE — REM POLYHESIVE ADULT PATIENT RETURN ELECTRODE: Brand: VALLEYLAB

## (undated) DEVICE — SUTURE STRATAFIX SPRL SZ 2-0 L9IN ABSRB VLT MH L36MM 1/2 SXPD2B408

## (undated) DEVICE — Device

## (undated) DEVICE — OCCLUSIVE GAUZE STRIP,3% BISMUTH TRIBROMOPHENATE IN PETROLATUM BLEND: Brand: XEROFORM

## (undated) DEVICE — BANDAGE,GAUZE,BULKEE II,4.5"X4.1YD,STRL: Brand: MEDLINE

## (undated) DEVICE — NEEDLE HYPO 21GA L1.5IN GRN POLYPR HUB S STL THN WALL IV

## (undated) DEVICE — (D)PREP SKN CHLRAPRP APPL 26ML -- CONVERT TO ITEM 371833

## (undated) DEVICE — BLADE SURG L6.25IN LNG FOR BRAITHWAITE SKIN GRFT KNIVE

## (undated) DEVICE — STERILE HOOK LOCK LATEX FREE ELASTIC BANDAGE 4INX5YD: Brand: HOOK LOCK™

## (undated) DEVICE — PLASMABLADE PS210-030S 3.0S LOCK: Brand: PLASMABLADE™

## (undated) DEVICE — SUTURE STRATAFIX SPRL SZ 4-0 L5IN ABSRB UD FS-2 L19MM 3/8 SXMP2B407

## (undated) DEVICE — SURGICAL PROCEDURE PACK BASIC ST FRANCIS

## (undated) DEVICE — SUTURE PROL SZ 0 L30IN NONABSORBABLE BLU FSLX L36MM 3/8 CIR 8690H

## (undated) DEVICE — SPONGE GZ W4XL4IN COT 12 PLY TYP VII WVN C FLD DSGN

## (undated) DEVICE — BUTTON SWITCH PENCIL BLADE ELECTRODE, HOLSTER: Brand: EDGE

## (undated) DEVICE — SPONGE LAP 18X18IN STRL -- 5/PK

## (undated) DEVICE — SOLUTION IV 1000ML 0.9% SOD CHL

## (undated) DEVICE — AMD ANTIMICROBIAL BANDAGE ROLL,6 PLY: Brand: KERLIX

## (undated) DEVICE — CONTAINER SPEC HISTOLOGY 900ML POLYPR

## (undated) DEVICE — SUTURE MCRYL SZ 3-0 L27IN ABSRB UD L19MM PS-2 3/8 CIR PRIM Y427H

## (undated) DEVICE — 2000CC GUARDIAN II: Brand: GUARDIAN

## (undated) DEVICE — DRAPE,CHEST,FENES,15X10,STERIL: Brand: MEDLINE

## (undated) DEVICE — SUTURE NONABSORBABLE MONOFILAMENT 5-0 PS-2 18 IN BLK ETHILON 1666H

## (undated) DEVICE — SUTURE ABSORBABLE MONOFILAMENT 4-0 V4 19 IN STRATAFIX SPRL SXPD2B426

## (undated) DEVICE — GARMENT,MEDLINE,DVT,INT,CALF,MED, GEN2: Brand: MEDLINE